# Patient Record
Sex: MALE | Race: WHITE | NOT HISPANIC OR LATINO | Employment: FULL TIME | URBAN - NONMETROPOLITAN AREA
[De-identification: names, ages, dates, MRNs, and addresses within clinical notes are randomized per-mention and may not be internally consistent; named-entity substitution may affect disease eponyms.]

---

## 2020-10-14 ENCOUNTER — APPOINTMENT (OUTPATIENT)
Dept: GENERAL RADIOLOGY | Facility: HOSPITAL | Age: 60
End: 2020-10-14

## 2020-10-14 ENCOUNTER — HOSPITAL ENCOUNTER (EMERGENCY)
Facility: HOSPITAL | Age: 60
Discharge: HOME | End: 2020-10-14
Admitting: EMERGENCY MEDICINE

## 2020-10-14 VITALS
DIASTOLIC BLOOD PRESSURE: 67 MMHG | OXYGEN SATURATION: 95 % | WEIGHT: 208 LBS | TEMPERATURE: 98 F | HEIGHT: 68 IN | RESPIRATION RATE: 19 BRPM | SYSTOLIC BLOOD PRESSURE: 131 MMHG | HEART RATE: 70 BPM | BODY MASS INDEX: 31.52 KG/M2

## 2020-10-14 DIAGNOSIS — S61.209A FINGERTIP AVULSION, INITIAL ENCOUNTER: ICD-10-CM

## 2020-10-14 DIAGNOSIS — S61.213A LACERATION OF LEFT MIDDLE FINGER WITHOUT FOREIGN BODY WITHOUT DAMAGE TO NAIL, INITIAL ENCOUNTER: ICD-10-CM

## 2020-10-14 DIAGNOSIS — Z23 NEED FOR TDAP VACCINATION: ICD-10-CM

## 2020-10-14 DIAGNOSIS — S62.639B OPEN FRACTURE OF TUFT OF DISTAL PHALANX OF FINGER: Primary | ICD-10-CM

## 2020-10-14 PROCEDURE — 90715 TDAP VACCINE 7 YRS/> IM: CPT | Performed by: PHYSICIAN ASSISTANT

## 2020-10-14 PROCEDURE — 90471 IMMUNIZATION ADMIN: CPT | Performed by: PHYSICIAN ASSISTANT

## 2020-10-14 PROCEDURE — 25010000002 PIPERACILLIN SOD-TAZOBACTAM PER 1 G: Performed by: PHYSICIAN ASSISTANT

## 2020-10-14 PROCEDURE — 25010000002 TDAP 5-2.5-18.5 LF-MCG/0.5 SUSPENSION: Performed by: PHYSICIAN ASSISTANT

## 2020-10-14 PROCEDURE — 25010000002 ONDANSETRON PER 1 MG: Performed by: PHYSICIAN ASSISTANT

## 2020-10-14 PROCEDURE — 96375 TX/PRO/DX INJ NEW DRUG ADDON: CPT

## 2020-10-14 PROCEDURE — 73130 X-RAY EXAM OF HAND: CPT

## 2020-10-14 PROCEDURE — 25010000003 LIDOCAINE 1 % SOLUTION: Performed by: PHYSICIAN ASSISTANT

## 2020-10-14 PROCEDURE — 25010000002 MORPHINE PER 10 MG: Performed by: EMERGENCY MEDICINE

## 2020-10-14 PROCEDURE — 96365 THER/PROPH/DIAG IV INF INIT: CPT

## 2020-10-14 PROCEDURE — 99284 EMERGENCY DEPT VISIT MOD MDM: CPT

## 2020-10-14 RX ORDER — ONDANSETRON 2 MG/ML
4 INJECTION INTRAMUSCULAR; INTRAVENOUS ONCE
Status: COMPLETED | OUTPATIENT
Start: 2020-10-14 | End: 2020-10-14

## 2020-10-14 RX ORDER — LIDOCAINE HYDROCHLORIDE 10 MG/ML
10 INJECTION, SOLUTION INFILTRATION; PERINEURAL ONCE
Status: COMPLETED | OUTPATIENT
Start: 2020-10-14 | End: 2020-10-14

## 2020-10-14 RX ORDER — HYDROCODONE BITARTRATE AND ACETAMINOPHEN 7.5; 325 MG/1; MG/1
1 TABLET ORAL EVERY 6 HOURS PRN
Qty: 10 TABLET | Refills: 0 | Status: SHIPPED | OUTPATIENT
Start: 2020-10-14

## 2020-10-14 RX ORDER — PAROXETINE HYDROCHLORIDE 20 MG/1
40 TABLET, FILM COATED ORAL EVERY MORNING
COMMUNITY

## 2020-10-14 RX ORDER — SODIUM CHLORIDE 0.9 % (FLUSH) 0.9 %
10 SYRINGE (ML) INJECTION AS NEEDED
Status: DISCONTINUED | OUTPATIENT
Start: 2020-10-14 | End: 2020-10-14 | Stop reason: HOSPADM

## 2020-10-14 RX ADMIN — MORPHINE SULFATE 4 MG: 4 INJECTION, SOLUTION INTRAMUSCULAR; INTRAVENOUS at 13:25

## 2020-10-14 RX ADMIN — PIPERACILLIN SODIUM AND TAZOBACTAM SODIUM 3.38 G: 3; .375 INJECTION, POWDER, LYOPHILIZED, FOR SOLUTION INTRAVENOUS at 13:31

## 2020-10-14 RX ADMIN — ONDANSETRON HYDROCHLORIDE 4 MG: 2 SOLUTION INTRAMUSCULAR; INTRAVENOUS at 13:25

## 2020-10-14 RX ADMIN — TETANUS TOXOID, REDUCED DIPHTHERIA TOXOID AND ACELLULAR PERTUSSIS VACCINE, ADSORBED 0.5 ML: 5; 2.5; 8; 8; 2.5 SUSPENSION INTRAMUSCULAR at 13:26

## 2020-10-14 RX ADMIN — SODIUM CHLORIDE 1000 ML: 9 INJECTION, SOLUTION INTRAVENOUS at 13:41

## 2020-10-14 RX ADMIN — LIDOCAINE HYDROCHLORIDE 10 ML: 10 INJECTION, SOLUTION INFILTRATION; PERINEURAL at 16:27

## 2020-10-14 NOTE — DISCHARGE INSTRUCTIONS
It is very important that you keep the dressing on your finger until you are seen at the orthopedic [facility] by Dr. Armstrong tomorrow.  There he will continue to discuss with you long-term wound care and management.  Please make sure to keep a bandage as well as antibiotic ointment over the laceration on your middle finger.  Today you were given a dose of antibiotics in the ED and Dr. Armstrong will discuss with you further need should arise for outpatient antibiotics.  Today her tetanus status was also updated is important you document this in your records.  This should not need to be updated again for the next 5 to 7 years pending additional injuries.  Please continue to rest, ice, and elevate your hand for the next 24 hours.  Please continue using anti-inflammatory medications and should you have breakthrough pain please use the pain medication.          Finger Fracture, Adult  A finger fracture is a break in any of the finger bones.  What are the causes?  The main cause of finger fractures is injury, such as from sports, a fall, or closing a drawer or door.  What increases the risk?  The following factors may make you more likely to develop this condition:  · Playing sports.  · Workplace activities that involve machinery.  · Osteoporosis. This condition makes your bones less dense and causes them to break easily.  What are the signs or symptoms?  The main symptoms of a fractured finger are pain, bruising, and swelling shortly after the injury. Other symptoms include:  · Stiffness.  · Exposed bones (compound fracture or open fracture), in severe cases.  How is this diagnosed?  This condition is diagnosed based on a physical exam, your medical history, and your symptoms. An X-ray will also be done to confirm the diagnosis.  How is this treated?  Treatment for this condition depends on the severity of the fracture. If the bones are still in place, the finger may be splinted to keep the finger still while it heals  (immobilization). If the bones are out of place, your health care provider may move them back into place by hand (manually) or surgically.  You may also need to do exercises to regain strength and flexibility (physical therapy) in your finger.  Follow these instructions at home:  If you have a splint:    · Wear the splint as told by your health care provider. Remove it only as told by your health care provider.  · Do not put pressure on any part of the splint until it is fully hardened. This may take several hours.  · Loosen the splint if your fingers tingle, become numb, or turn cold and blue.  · Keep the splint clean.  · If the splint is not waterproof:  ? Do not let it get wet.  ? Cover it with a watertight covering when you take a bath or a shower.  · Ask your health care provider when it is safe for you to drive.  Managing pain, stiffness, and swelling  · If directed, put ice on the injured area:  ? If you have a removable splint, remove it as told by your health care provider.  ? Put ice in a plastic bag.  ? Place a towel between your skin and the bag.  ? Leave the ice on for 20 minutes, 2-3 times a day.  · Move your fingers often to avoid stiffness and to lessen swelling.  · Raise (elevate) the injured area above the level of your heart while you are sitting or lying down.  Activity  · Do not drive or use heavy machinery while taking prescription pain medicine.  · Do physical therapy exercises as told by your health care provider.  · Return to your normal activities as told by your health care provider. Ask your health care provider what activities are safe for you.  General instructions  · Do not use any products that contain nicotine or tobacco, such as cigarettes and e-cigarettes. These can delay bone healing. If you need help quitting, ask your health care provider.  · Take over-the-counter and prescription medicines only as told by your health care provider.  · Keep all follow-up visits as told by your  health care provider. This is important.  Contact a health care provider if:  · Your pain or swelling gets worse, even with treatment.  · You have trouble moving your finger.  Get help right away if:  · Your finger becomes numb or blue.  Summary  · A finger fracture is a break in any of the finger bones.  · Injury is the main cause of finger fractures.  · Treatment for this condition depends on the severity of the fracture.  This information is not intended to replace advice given to you by your health care provider. Make sure you discuss any questions you have with your health care provider.  Document Released: 04/01/2002 Document Revised: 04/14/2020 Document Reviewed: 08/01/2018  [employer] Patient Education © 2020 [employer]    Nonsutured Laceration Care  A laceration is a cut that may go through all layers of the skin and extend into the tissue that is right under the skin. This type of cut is usually stitched up (sutured) or closed with tape (adhesive strips) or skin glue shortly after the injury happens.  However, if the wound is dirty or if several hours pass before medical treatment is provided, it is likely that germs (bacteria) will enter the wound. Closing a laceration after bacteria have entered it increases the risk of infection. In these cases, your health care provider may leave the laceration open (nonsutured) and cover it with a bandage. This type of treatment helps prevent infection and allows the wound to heal from the deepest layer of tissue damage up to the surface.  An open fracture is a type of injury that may involve nonsutured lacerations. An open fracture is a break in a bone that happens along with lacerations through the skin at the fracture site.  What are the risks?  Caring for a nonsutured laceration is safe. However, problems may occur, including a higher risk for:  · Scarring.  · Infection.  · Slow healing.  Supplies needed:  · Soap.  · Hand .  · Sterile water or irrigation  solution.  · Bandages (dressings).  · Clean towel.  · Antibiotic ointment.  How to care for your nonsutured laceration  Follow instructions from your health care provider about how to take care of your wound.  · Keep the wound clean and dry.  · Change any dressings as told by your health care provider. This includes changing the dressing when it starts to smell, or when it gets wet or dirty.  · Clean the wound one time each day, or as often as told by your health care provider. To clean your wound:  ? Wash your hands with soap and water. If soap and water are not available, use hand .  ? Remove any dressing as told by your health care provider.  ? Clean the wound with sterile water or irrigation solution as told by your health care provider.  ? Pat the wound dry with a clean towel. Do not rub the wound.  ? Apply a thin layer of antibiotic ointment to the wound as told by your health care provider. This will prevent infection and keep the dressing from sticking to the wound.  ? Apply a new dressing as told by your health care provider.  · Check your wound every day for signs of infection. Watch for:  ? Redness, swelling, or pain.  ? Fluid, blood, or pus.  ? Bad smell on the wound or dressing.  ? Warmth.  · Do not take baths, swim, or do anything that puts your wound underwater until your health care provider approves.  · Do not scratch or pick at the wound.  Follow these instructions at home:  · Take or apply over-the-counter and prescription medicines only as told by your health care provider.  · If you were prescribed an antibiotic medicine, take or apply it as told by your health care provider. Do not stop using the antibiotic even if your condition improves.  · Do not inject anything into the wound unless directed by your health care provider.  · Raise (elevate) the injured area above the level of your heart while you are sitting or lying down, if possible.  · If directed, put ice on the affected  area:  ? Put ice in a plastic bag.  ? Place a towel between your skin and the bag.  ? Leave the ice on for 20 minutes, 2-3 times a day.  · Keep all follow-up visits as told by your health care provider. This is important.  Contact a health care provider if:  · You received a tetanus shot and you have swelling, severe pain, redness, or bleeding at the injection site.  · You have a fever.  · Your pain is not controlled with medicine.  · You have increased redness, swelling, or pain at the site of your wound.  · You have fluid, blood, or pus coming from your wound.  · You notice a bad smell coming from your wound or your dressing.  · You notice something coming out of the wound, such as wood or glass.  · You notice a change in the color of your skin near your wound.  · You develop a new rash.  · You need to change the dressing frequently due to fluid, blood, or pus draining from the wound.  · You develop numbness around your wound.  Get help right away if:  · Your pain suddenly increases and is severe.  · You develop severe swelling around the wound.  · The wound is on your hand or foot and you cannot properly move a finger or toe.  · The wound is on your hand or foot, and you notice that your fingers or toes look pale or bluish.  · You have a red streak going away from your wound.  Summary  · A laceration is a cut that may go through all layers of the skin and extend into the tissue that is right under the skin. It is usually closed with stitches, tape, or skin glue shortly after the injury happens.  · If a wound is dirty or if several hours pass before medical treatment is provided, the laceration may be kept open (nonsutured) and covered with a bandage.  · This type of treatment helps prevent infection and allows the wound to heal from the deepest layer of tissue damage up to the surface.  · Follow instructions from your health care provider about how to take care of your wound.  This information is not intended to  replace advice given to you by your health care provider. Make sure you discuss any questions you have with your health care provider.  Document Released: 11/15/2007 Document Revised: 04/10/2020 Document Reviewed: 01/07/2019  [employer] Patient Education © 2020 [employer]

## 2020-10-14 NOTE — ED PROVIDER NOTES
Subjective   History of Present Illness    Patient is a 60-year-old male presenting to ED with left hand injury.  Patient reported he is right-hand dominant.  Patient reported that approximately 10:30 AM this morning he was at work pulling on a pulley with his right dominant hand when his left hand which was holding it in place got stuck on part of the fan belt.  Patient reported at that time his hand got pulled through the pulley system where he lacerated off the left index finger pad and sustained a small laceration to the dorsal aspect of the left middle finger.  Patient reported pain to the distal left index finger however he denies any decreased range of motion of any of the joints.  Patient denies any other joint tenderness, decreased range of motion of joints, or any other injury sustained in the event.  Patient denies any weakness or numbness of the left hand.  Patient denies any emesis but reported he has been slightly nauseous since the event.  Patient denies any medication use prior to arrival.  Patient denies any recent illnesses or known sick contact.  Patient is unaware of when his last tetanus status was updated and believes it was over 5 years ago.    Patient has known medication allergies to codeine.  Patient takes daily Paxil.  Patient has no significant medical history.  Patient has a surgical history positive for hernia repair.  Patient is a current 1 pack/day cigarette smoker.  Patient denies use of alcohol, marijuana, or any other IV/recreational/illicit drugs.  Patient denies use of any other tobacco products.    Records reviewed show patient has no previous ED visits, outpatient visits, or inpatient visits.  Patient has no previous [facility] imaging.    Review of Systems   Constitutional: Negative.    HENT: Negative.    Eyes: Negative.    Respiratory: Negative.    Cardiovascular: Negative.    Gastrointestinal: Positive for nausea. Negative for vomiting.   Genitourinary: Negative.     Musculoskeletal: Positive for arthralgias (Left distal index finger) and joint swelling (Left distal index finger).   Skin: Positive for wound (Laceration to left index finger and left middle finger).   Neurological: Negative.  Negative for weakness and numbness.   Psychiatric/Behavioral: Negative.    All other systems reviewed and are negative.      History reviewed. No pertinent past medical history.    Allergies   Allergen Reactions   • Codeine Unknown - High Severity     Pt reports he is unsure because the last time he took this medication was years ago       Past Surgical History:   Procedure Laterality Date   • HERNIA REPAIR         History reviewed. No pertinent family history.    Social History     Socioeconomic History   • Marital status: Single     Spouse name: Not on file   • Number of children: Not on file   • Years of education: Not on file   • Highest education level: Not on file   Tobacco Use   • Smoking status: Current Every Day Smoker     Packs/day: 2.00   Substance and Sexual Activity   • Alcohol use: Never     Frequency: Never           Objective   Physical Exam  Vitals signs and nursing note reviewed.   Constitutional:       General: He is in acute distress (Appears uncomfortable due to pain).      Appearance: Normal appearance. He is well-developed, well-groomed and overweight. He is not diaphoretic.   HENT:      Head: Normocephalic and atraumatic.      Mouth/Throat:      Mouth: Mucous membranes are moist.      Pharynx: Oropharynx is clear.   Eyes:      Conjunctiva/sclera: Conjunctivae normal.      Pupils: Pupils are equal, round, and reactive to light.   Neck:      Musculoskeletal: Normal range of motion.   Cardiovascular:      Rate and Rhythm: Normal rate and regular rhythm.      Pulses: Normal pulses.           Radial pulses are 2+ on the right side and 2+ on the left side.   Pulmonary:      Effort: Pulmonary effort is normal. No respiratory distress.      Breath sounds: Normal breath  sounds.   Abdominal:      General: Bowel sounds are normal.      Palpations: Abdomen is soft.   Musculoskeletal:        Hands:       Comments: Left index finger pad avulsed off with slight exposure of the distal bone.  Nail has a very small portion of the nail left on the ulnar side with most of the nail and nailbed missing.  Left index finger DIP joint has full range of motion however is swollen from the DIP joint distally.  Significant tenderness to palpitation over the distal left index finger.  Bleeding is well controlled at this time.    Approximately 1.5 cm laceration to the dorsal aspect of the left middle finger just proximal to the DIP joint.  Laceration is superficial in nature.  Left middle finger with full range of motion of all joints, no joint swelling, and minimal tenderness to palpitation over the laceration.    5/5 symmetric strength bilateral upper extremities.  Bilateral upper extremities neurovascularly intact distally with cap refill less than 2 seconds.    Left wrist as well as left proximal upper extremity with no acute findings.   Skin:     General: Skin is warm.      Capillary Refill: Capillary refill takes less than 2 seconds.      Findings: Laceration (As described in [facility] section) present.   Neurological:      Mental Status: He is alert and oriented to person, place, and time.      GCS: GCS eye subscore is 4. GCS verbal subscore is 5. GCS motor subscore is 6.      Sensory: Sensation is intact.      Motor: Motor function is intact.      Gait: Gait normal.   Psychiatric:         Attention and Perception: Attention normal.         Mood and Affect: Mood and affect normal.         Speech: Speech normal.         Behavior: Behavior normal. Behavior is cooperative.         Procedures           ED Course  ED Course as of Oct 15 0052   Wed Oct 14, 2020   1305 X-ray pending dictation.    [JS]   132 XR shows: Bony avulsion injury at the tuft of the second digit phalanx with additional overlying soft  "tissue injury.    [JS]   1340 Per Anny SABILLON after administration of pain medication patient became hypotensive at 80/38.  A liter of IV fluids hung at this time and patient is sitting up.    [JS]   1415 Discussed case with Dr. Adtii Cody who is in agreement with consult to Ortho at this time for further recommendations on wound dressing and care.    [JS]   1430 Phone discussion with Dr. Armstrong, ortho.  Recommends that patient's second limb finger be wrapped in Adaptic's very loosely with overlying Coban.  Would like patient to be seen in the clinic tomorrow for outpatient follow-up and continued wound care.  In agreement with tetanus updating here as well as single dose of IV antibiotics.  Dr. Armstrong feels patient does not need to go home on any further antibiotics.  Dr. Armstrong with no further recommendations or request at this time.    [JS]   1452 Upon reevaluation at this time patient is feeling much better with return to normotensive blood pressures and control of pain.  Discussed with patient x-ray findings as well as orthopedic discussion.  Discussed with patient need to repair left middle finger wound with sutures.  Patient declines treatment with sutures.  Discussed alternatives of using skin adhesive and Steri-Strips.  Patient continues to decline stating \"this will heal fine and I do not want to lose the bending of my finger.\"  Discussed with patient risk, benefits, and alternatives.  Patient is able to voice is back to me and continues to decline suture, adhesive glue, or Steri-Strip repair of the left middle finger.  Discussed with patient that in regards to the left index finger we will need to dress it and it is very important he follow-up at the orthopedic [place] tomorrow for continued wound reevaluation as well as long-term wound care.  Discussed with patient ability to provide a short course of pain medication.  Discussed that today his tetanus status was updated is important that he document " this in his record noting he will not require an additional booster for the next 5 to 7 years pending additional injuries.  Discussed with patient strict return precautions and need for immediate return to ED should he develop any new or worsening symptoms.  Once again offered patient repair of the left middle finger and he continues to decline.  Patient with no further questions, concerns, or needs at this time and is now stable for discharge.    [JS]   1540 [facility] report #[id number].  Patient has no prescription filled within the past year.Discussed report with Dr. Cody who is in agreement with short course of pain medication at this time.    [JS]      ED Course User Index  [JS] Leo Alvarez PA-C                                           MDM  Number of Diagnoses or Management Options  Fingertip avulsion, initial encounter:   Laceration of left middle finger without foreign body without damage to nail, initial encounter:   Need for Tdap vaccination:   Open fracture of tuft of distal phalanx of finger:      Amount and/or Complexity of Data Reviewed  Tests in the radiology section of CPT®: reviewed and ordered  Tests in the medicine section of CPT®: ordered and reviewed  Decide to obtain previous medical records or to obtain history from someone other than the patient: yes  Review and summarize past medical records: yes  Discuss the patient with other providers: yes (Dr. Preciado (attending)   Dr. Armstrong (Ortho))    Patient Progress  Patient progress: improved      Final diagnoses:   Open fracture of tuft of distal phalanx of finger   Laceration of left middle finger without foreign body without damage to nail, initial encounter   Fingertip avulsion, initial encounter   Need for Tdap vaccination            Leo Alvarez PA-C  10/15/20 0052

## 2023-12-04 ENCOUNTER — NEW PATIENT COMPREHENSIVE (OUTPATIENT)
Dept: URBAN - METROPOLITAN AREA CLINIC 100 | Facility: CLINIC | Age: 63
End: 2023-12-04

## 2023-12-04 DIAGNOSIS — B39.4: ICD-10-CM

## 2023-12-04 DIAGNOSIS — D49.89: ICD-10-CM

## 2023-12-04 PROCEDURE — 92004 COMPRE OPH EXAM NEW PT 1/>: CPT

## 2023-12-04 ASSESSMENT — TONOMETRY
OS_IOP_MMHG: 14
OD_IOP_MMHG: 14

## 2023-12-04 ASSESSMENT — VISUAL ACUITY
OS_SC: 20/100-2
OD_SC: 20/400

## 2024-06-18 ENCOUNTER — TRANSCRIBE ORDERS (OUTPATIENT)
Dept: ADMINISTRATIVE | Age: 64
End: 2024-06-18

## 2024-06-18 DIAGNOSIS — R00.2 PALPITATIONS: ICD-10-CM

## 2024-06-18 DIAGNOSIS — R07.89 OTHER CHEST PAIN: Primary | ICD-10-CM

## 2024-06-18 DIAGNOSIS — R06.00 DYSPNEA, UNSPECIFIED TYPE: ICD-10-CM

## 2024-07-29 ENCOUNTER — HOSPITAL ENCOUNTER (OUTPATIENT)
Dept: NUCLEAR MEDICINE | Age: 64
Discharge: HOME | End: 2024-07-31
Payer: MEDICARE

## 2024-07-29 DIAGNOSIS — R06.00 DYSPNEA, UNSPECIFIED TYPE: ICD-10-CM

## 2024-07-29 DIAGNOSIS — R07.89 OTHER CHEST PAIN: ICD-10-CM

## 2024-07-29 DIAGNOSIS — R00.2 PALPITATIONS: ICD-10-CM

## 2024-07-29 LAB
NUC STRESS EJECTION FRACTION: 30 %
STRESS BASELINE DIAS BP: 87 MMHG
STRESS BASELINE HR: 85 BPM
STRESS BASELINE SYS BP: 143 MMHG
STRESS EXERCISE DUR MIN: 5 MIN
STRESS EXERCISE DUR SEC: 0 SEC
STRESS PEAK DIAS BP: 88 MMHG
STRESS PEAK SYS BP: 144 MMHG
STRESS PERCENT HR ACHIEVED: 79 %
STRESS POST PEAK HR: 122 BPM
STRESS RATE PRESSURE PRODUCT: NORMAL BPM*MMHG
STRESS STAGE 1 BP: NORMAL MMHG
STRESS STAGE 1 COMMENTS: NORMAL
STRESS STAGE 1 DURATION: 1 MIN:SEC
STRESS STAGE 1 HR: 99 BPM
STRESS STAGE 2 BP: NORMAL MMHG
STRESS STAGE 2 DURATION: 1 MIN:SEC
STRESS STAGE 2 HR: 98 BPM
STRESS STAGE 3 BP: NORMAL MMHG
STRESS STAGE 3 COMMENTS: NORMAL
STRESS STAGE 3 DURATION: 1 MIN:SEC
STRESS STAGE 3 HR: 103 BPM
STRESS STAGE 4 BP: NORMAL MMHG
STRESS STAGE 4 DURATION: 1 MIN:SEC
STRESS STAGE 4 HR: 92 BPM
STRESS STAGE 5 COMMENTS: NORMAL
STRESS STAGE 5 DURATION: 1 MIN:SEC
STRESS STAGE 5 HR: 96 BPM
STRESS STAGE RECOVERY 1 BP: NORMAL MMHG
STRESS STAGE RECOVERY 1 COMMENTS: NORMAL
STRESS STAGE RECOVERY 1 DURATION: 1 MIN:SEC
STRESS STAGE RECOVERY 1 HR: 122 BPM
STRESS TARGET HR: 154 BPM

## 2024-07-29 PROCEDURE — 93016 CV STRESS TEST SUPVJ ONLY: CPT | Performed by: INTERNAL MEDICINE

## 2024-07-29 PROCEDURE — 93018 CV STRESS TEST I&R ONLY: CPT | Performed by: INTERNAL MEDICINE

## 2024-07-29 PROCEDURE — 6360000002 HC RX W HCPCS: Performed by: NURSE PRACTITIONER

## 2024-07-29 PROCEDURE — 3430000000 HC RX DIAGNOSTIC RADIOPHARMACEUTICAL: Performed by: NURSE PRACTITIONER

## 2024-07-29 PROCEDURE — 78452 HT MUSCLE IMAGE SPECT MULT: CPT

## 2024-07-29 PROCEDURE — 78452 HT MUSCLE IMAGE SPECT MULT: CPT | Performed by: INTERNAL MEDICINE

## 2024-07-29 PROCEDURE — 93017 CV STRESS TEST TRACING ONLY: CPT

## 2024-07-29 PROCEDURE — A9502 TC99M TETROFOSMIN: HCPCS | Performed by: NURSE PRACTITIONER

## 2024-07-29 RX ORDER — REGADENOSON 0.08 MG/ML
0.4 INJECTION, SOLUTION INTRAVENOUS
Status: COMPLETED | OUTPATIENT
Start: 2024-07-29 | End: 2024-07-29

## 2024-07-29 RX ADMIN — TETROFOSMIN 8 MILLICURIE: 0.23 INJECTION, POWDER, LYOPHILIZED, FOR SOLUTION INTRAVENOUS at 09:20

## 2024-07-29 RX ADMIN — REGADENOSON 0.4 MG: 0.08 INJECTION, SOLUTION INTRAVENOUS at 11:44

## 2024-07-29 RX ADMIN — TETROFOSMIN 24 MILLICURIE: 0.23 INJECTION, POWDER, LYOPHILIZED, FOR SOLUTION INTRAVENOUS at 11:50

## 2025-01-16 ENCOUNTER — TRANSCRIBE ORDERS (OUTPATIENT)
Dept: ADMINISTRATIVE | Age: 65
End: 2025-01-16

## 2025-01-16 DIAGNOSIS — N18.4 STAGE 4 CHRONIC KIDNEY DISEASE (HCC): Primary | ICD-10-CM

## 2025-01-23 ENCOUNTER — HOSPITAL ENCOUNTER (OUTPATIENT)
Dept: ULTRASOUND IMAGING | Age: 65
Discharge: HOME | End: 2025-01-23
Attending: INTERNAL MEDICINE
Payer: MEDICARE

## 2025-01-23 DIAGNOSIS — N18.4 STAGE 4 CHRONIC KIDNEY DISEASE (HCC): ICD-10-CM

## 2025-01-23 PROCEDURE — 76770 US EXAM ABDO BACK WALL COMP: CPT

## 2025-03-14 ENCOUNTER — TRANSCRIBE ORDERS (OUTPATIENT)
Dept: ADMINISTRATIVE | Facility: HOSPITAL | Age: 65
End: 2025-03-14
Payer: MEDICAID

## 2025-03-14 DIAGNOSIS — N18.4 CKD (CHRONIC KIDNEY DISEASE), STAGE IV: Primary | ICD-10-CM

## 2025-04-08 ENCOUNTER — TELEPHONE (OUTPATIENT)
Dept: INTERVENTIONAL RADIOLOGY/VASCULAR | Facility: HOSPITAL | Age: 65
End: 2025-04-08
Payer: MEDICARE

## 2025-04-08 NOTE — TELEPHONE ENCOUNTER
Called patient to confirm appointment and went over instructions for the procedure.  Patient confirmed and voiced understanding.  
Home

## 2025-04-09 ENCOUNTER — HOSPITAL ENCOUNTER (OUTPATIENT)
Dept: CT IMAGING | Facility: HOSPITAL | Age: 65
Discharge: HOME | End: 2025-04-09
Admitting: RADIOLOGY
Payer: MEDICARE

## 2025-04-09 VITALS
SYSTOLIC BLOOD PRESSURE: 129 MMHG | TEMPERATURE: 98.3 F | WEIGHT: 206.57 LBS | HEIGHT: 66 IN | HEART RATE: 67 BPM | DIASTOLIC BLOOD PRESSURE: 76 MMHG | RESPIRATION RATE: 16 BRPM | BODY MASS INDEX: 33.2 KG/M2 | OXYGEN SATURATION: 94 %

## 2025-04-09 DIAGNOSIS — N18.4 CKD (CHRONIC KIDNEY DISEASE), STAGE IV: ICD-10-CM

## 2025-04-09 LAB
GLUCOSE BLDC GLUCOMTR-MCNC: 114 MG/DL (ref 70–130)
HGB BLD-MCNC: 13 G/DL (ref 13–17.7)
INR PPP: 0.91 (ref 0.91–1.09)
PLATELET # BLD AUTO: 325 10*3/MM3 (ref 140–450)
PROTHROMBIN TIME: 12.8 SECONDS (ref 11.8–14.8)

## 2025-04-09 PROCEDURE — 85610 PROTHROMBIN TIME: CPT | Performed by: RADIOLOGY

## 2025-04-09 PROCEDURE — 77012 CT SCAN FOR NEEDLE BIOPSY: CPT

## 2025-04-09 PROCEDURE — 25010000002 FENTANYL CITRATE (PF) 50 MCG/ML SOLUTION: Performed by: RADIOLOGY

## 2025-04-09 PROCEDURE — 25010000002 MIDAZOLAM PER 1MG: Performed by: RADIOLOGY

## 2025-04-09 PROCEDURE — 25010000002 LIDOCAINE 1 % SOLUTION: Performed by: RADIOLOGY

## 2025-04-09 PROCEDURE — 82948 REAGENT STRIP/BLOOD GLUCOSE: CPT

## 2025-04-09 PROCEDURE — 85049 AUTOMATED PLATELET COUNT: CPT | Performed by: RADIOLOGY

## 2025-04-09 PROCEDURE — 85018 HEMOGLOBIN: CPT | Performed by: RADIOLOGY

## 2025-04-09 RX ORDER — FENTANYL CITRATE 50 UG/ML
INJECTION, SOLUTION INTRAMUSCULAR; INTRAVENOUS AS NEEDED
Status: COMPLETED | OUTPATIENT
Start: 2025-04-09 | End: 2025-04-09

## 2025-04-09 RX ORDER — ASPIRIN 81 MG/1
81 TABLET ORAL DAILY
COMMUNITY

## 2025-04-09 RX ORDER — SODIUM CHLORIDE 9 MG/ML
40 INJECTION, SOLUTION INTRAVENOUS AS NEEDED
Status: DISCONTINUED | OUTPATIENT
Start: 2025-04-09 | End: 2025-04-10 | Stop reason: HOSPADM

## 2025-04-09 RX ORDER — MIDAZOLAM HYDROCHLORIDE 2 MG/2ML
INJECTION, SOLUTION INTRAMUSCULAR; INTRAVENOUS AS NEEDED
Status: COMPLETED | OUTPATIENT
Start: 2025-04-09 | End: 2025-04-09

## 2025-04-09 RX ORDER — SODIUM CHLORIDE 0.9 % (FLUSH) 0.9 %
3 SYRINGE (ML) INJECTION EVERY 12 HOURS SCHEDULED
Status: DISCONTINUED | OUTPATIENT
Start: 2025-04-09 | End: 2025-04-10 | Stop reason: HOSPADM

## 2025-04-09 RX ORDER — CARVEDILOL 6.25 MG/1
6.25 TABLET ORAL 2 TIMES DAILY WITH MEALS
COMMUNITY

## 2025-04-09 RX ORDER — SODIUM CHLORIDE 0.9 % (FLUSH) 0.9 %
10 SYRINGE (ML) INJECTION AS NEEDED
Status: DISCONTINUED | OUTPATIENT
Start: 2025-04-09 | End: 2025-04-10 | Stop reason: HOSPADM

## 2025-04-09 RX ORDER — LIDOCAINE HYDROCHLORIDE 10 MG/ML
INJECTION, SOLUTION INFILTRATION; PERINEURAL AS NEEDED
Status: COMPLETED | OUTPATIENT
Start: 2025-04-09 | End: 2025-04-09

## 2025-04-09 RX ORDER — ROSUVASTATIN CALCIUM 40 MG/1
40 TABLET, COATED ORAL NIGHTLY
COMMUNITY

## 2025-04-09 RX ORDER — ERGOCALCIFEROL 1.25 MG/1
50000 CAPSULE, LIQUID FILLED ORAL WEEKLY
COMMUNITY

## 2025-04-09 RX ADMIN — FENTANYL CITRATE 25 MCG: 50 INJECTION INTRAMUSCULAR; INTRAVENOUS at 09:14

## 2025-04-09 RX ADMIN — LIDOCAINE HYDROCHLORIDE 10 ML: 10 INJECTION, SOLUTION INFILTRATION; PERINEURAL at 09:16

## 2025-04-09 RX ADMIN — MIDAZOLAM HYDROCHLORIDE 1 MG: 1 INJECTION, SOLUTION INTRAMUSCULAR; INTRAVENOUS at 09:14

## 2025-04-16 ENCOUNTER — TELEPHONE (OUTPATIENT)
Dept: INTERVENTIONAL RADIOLOGY/VASCULAR | Facility: HOSPITAL | Age: 65
End: 2025-04-16
Payer: MEDICARE

## 2025-05-08 VITALS — BODY MASS INDEX: 31.52 KG/M2 | WEIGHT: 208 LBS | HEIGHT: 68 IN

## 2025-05-08 DIAGNOSIS — N05.9 FIBRILLARY GLOMERULONEPHRITIS: Primary | ICD-10-CM

## 2025-05-08 DIAGNOSIS — N04.0 NEPHROTIC SYNDROME WITH MINOR GLOMERULAR ABNORMALITY: ICD-10-CM

## 2025-05-08 RX ORDER — MEPERIDINE HYDROCHLORIDE 25 MG/ML
12.5 INJECTION INTRAMUSCULAR; INTRAVENOUS; SUBCUTANEOUS PRN
OUTPATIENT
Start: 2025-05-08

## 2025-05-08 RX ORDER — SODIUM CHLORIDE 0.9 % (FLUSH) 0.9 %
5-40 SYRINGE (ML) INJECTION PRN
OUTPATIENT
Start: 2025-05-08

## 2025-05-08 RX ORDER — ACETAMINOPHEN 325 MG/1
650 TABLET ORAL ONCE
OUTPATIENT
Start: 2025-05-08

## 2025-05-08 RX ORDER — EPINEPHRINE 1 MG/ML
0.3 INJECTION, SOLUTION, CONCENTRATE INTRAVENOUS PRN
OUTPATIENT
Start: 2025-05-08

## 2025-05-08 RX ORDER — ONDANSETRON 2 MG/ML
8 INJECTION INTRAMUSCULAR; INTRAVENOUS
OUTPATIENT
Start: 2025-05-08

## 2025-05-08 RX ORDER — ALBUTEROL SULFATE 90 UG/1
4 INHALANT RESPIRATORY (INHALATION) PRN
OUTPATIENT
Start: 2025-05-08

## 2025-05-08 RX ORDER — DIPHENHYDRAMINE HYDROCHLORIDE 50 MG/ML
50 INJECTION, SOLUTION INTRAMUSCULAR; INTRAVENOUS ONCE
OUTPATIENT
Start: 2025-05-08 | End: 2025-05-08

## 2025-05-08 RX ORDER — HEPARIN 100 UNIT/ML
500 SYRINGE INTRAVENOUS PRN
OUTPATIENT
Start: 2025-05-08

## 2025-05-08 RX ORDER — SODIUM CHLORIDE 9 MG/ML
INJECTION, SOLUTION INTRAVENOUS CONTINUOUS
OUTPATIENT
Start: 2025-05-08

## 2025-05-08 RX ORDER — SODIUM CHLORIDE 9 MG/ML
5-250 INJECTION, SOLUTION INTRAVENOUS ONCE
OUTPATIENT
Start: 2025-05-08 | End: 2025-05-08

## 2025-05-08 RX ORDER — HYDROCORTISONE SODIUM SUCCINATE 100 MG/2ML
100 INJECTION INTRAMUSCULAR; INTRAVENOUS
OUTPATIENT
Start: 2025-05-08

## 2025-05-08 RX ORDER — ACETAMINOPHEN 325 MG/1
650 TABLET ORAL
OUTPATIENT
Start: 2025-05-08

## 2025-05-08 RX ORDER — DIPHENHYDRAMINE HYDROCHLORIDE 50 MG/ML
50 INJECTION, SOLUTION INTRAMUSCULAR; INTRAVENOUS
OUTPATIENT
Start: 2025-05-08

## 2025-05-23 ENCOUNTER — HOSPITAL ENCOUNTER (OUTPATIENT)
Dept: INFUSION THERAPY | Age: 65
Setting detail: INFUSION SERIES
Discharge: HOME | End: 2025-05-23
Payer: MEDICARE

## 2025-05-23 VITALS
HEART RATE: 95 BPM | DIASTOLIC BLOOD PRESSURE: 77 MMHG | TEMPERATURE: 97.2 F | WEIGHT: 206.9 LBS | OXYGEN SATURATION: 95 % | RESPIRATION RATE: 18 BRPM | SYSTOLIC BLOOD PRESSURE: 157 MMHG | BODY MASS INDEX: 31.46 KG/M2

## 2025-05-23 DIAGNOSIS — N04.0 NEPHROTIC SYNDROME WITH MINOR GLOMERULAR ABNORMALITY: ICD-10-CM

## 2025-05-23 DIAGNOSIS — N05.9 FIBRILLARY GLOMERULONEPHRITIS: Primary | ICD-10-CM

## 2025-05-23 PROCEDURE — 6360000002 HC RX W HCPCS: Performed by: INTERNAL MEDICINE

## 2025-05-23 PROCEDURE — 96413 CHEMO IV INFUSION 1 HR: CPT

## 2025-05-23 PROCEDURE — 96415 CHEMO IV INFUSION ADDL HR: CPT

## 2025-05-23 PROCEDURE — 6370000000 HC RX 637 (ALT 250 FOR IP): Performed by: INTERNAL MEDICINE

## 2025-05-23 PROCEDURE — 96375 TX/PRO/DX INJ NEW DRUG ADDON: CPT

## 2025-05-23 PROCEDURE — 2580000003 HC RX 258: Performed by: INTERNAL MEDICINE

## 2025-05-23 RX ORDER — SODIUM CHLORIDE 9 MG/ML
5-250 INJECTION, SOLUTION INTRAVENOUS ONCE
Status: CANCELLED | OUTPATIENT
Start: 2025-05-30 | End: 2025-05-30

## 2025-05-23 RX ORDER — EPINEPHRINE 1 MG/ML
0.3 INJECTION, SOLUTION, CONCENTRATE INTRAVENOUS PRN
Status: CANCELLED | OUTPATIENT
Start: 2025-05-30

## 2025-05-23 RX ORDER — ACETAMINOPHEN 325 MG/1
650 TABLET ORAL ONCE
Status: CANCELLED | OUTPATIENT
Start: 2025-05-30

## 2025-05-23 RX ORDER — ALBUTEROL SULFATE 90 UG/1
4 INHALANT RESPIRATORY (INHALATION) PRN
Status: CANCELLED | OUTPATIENT
Start: 2025-05-30

## 2025-05-23 RX ORDER — HEPARIN 100 UNIT/ML
500 SYRINGE INTRAVENOUS PRN
Status: CANCELLED | OUTPATIENT
Start: 2025-05-30

## 2025-05-23 RX ORDER — SODIUM CHLORIDE 9 MG/ML
INJECTION, SOLUTION INTRAVENOUS CONTINUOUS
Status: CANCELLED | OUTPATIENT
Start: 2025-05-30

## 2025-05-23 RX ORDER — DIPHENHYDRAMINE HYDROCHLORIDE 50 MG/ML
50 INJECTION, SOLUTION INTRAMUSCULAR; INTRAVENOUS ONCE
Status: CANCELLED | OUTPATIENT
Start: 2025-05-30 | End: 2025-05-30

## 2025-05-23 RX ORDER — DIPHENHYDRAMINE HYDROCHLORIDE 50 MG/ML
50 INJECTION, SOLUTION INTRAMUSCULAR; INTRAVENOUS ONCE
Status: COMPLETED | OUTPATIENT
Start: 2025-05-23 | End: 2025-05-23

## 2025-05-23 RX ORDER — DIPHENHYDRAMINE HYDROCHLORIDE 50 MG/ML
50 INJECTION, SOLUTION INTRAMUSCULAR; INTRAVENOUS
Status: CANCELLED | OUTPATIENT
Start: 2025-05-30

## 2025-05-23 RX ORDER — ONDANSETRON 2 MG/ML
8 INJECTION INTRAMUSCULAR; INTRAVENOUS
Status: CANCELLED | OUTPATIENT
Start: 2025-05-30

## 2025-05-23 RX ORDER — SODIUM CHLORIDE 0.9 % (FLUSH) 0.9 %
5-40 SYRINGE (ML) INJECTION PRN
Status: DISCONTINUED | OUTPATIENT
Start: 2025-05-23 | End: 2025-05-24 | Stop reason: HOSPADM

## 2025-05-23 RX ORDER — HYDROCORTISONE SODIUM SUCCINATE 100 MG/2ML
100 INJECTION INTRAMUSCULAR; INTRAVENOUS
Status: CANCELLED | OUTPATIENT
Start: 2025-05-30

## 2025-05-23 RX ORDER — MEPERIDINE HYDROCHLORIDE 25 MG/ML
12.5 INJECTION INTRAMUSCULAR; INTRAVENOUS; SUBCUTANEOUS PRN
Status: CANCELLED | OUTPATIENT
Start: 2025-05-30

## 2025-05-23 RX ORDER — SODIUM CHLORIDE 9 MG/ML
5-250 INJECTION, SOLUTION INTRAVENOUS ONCE
Status: COMPLETED | OUTPATIENT
Start: 2025-05-23 | End: 2025-05-23

## 2025-05-23 RX ORDER — SODIUM CHLORIDE 0.9 % (FLUSH) 0.9 %
5-40 SYRINGE (ML) INJECTION PRN
Status: CANCELLED | OUTPATIENT
Start: 2025-05-30

## 2025-05-23 RX ORDER — ACETAMINOPHEN 325 MG/1
650 TABLET ORAL
Status: CANCELLED | OUTPATIENT
Start: 2025-05-30

## 2025-05-23 RX ORDER — ACETAMINOPHEN 325 MG/1
650 TABLET ORAL ONCE
Status: COMPLETED | OUTPATIENT
Start: 2025-05-23 | End: 2025-05-23

## 2025-05-23 RX ADMIN — SODIUM CHLORIDE 800 MG: 0.9 INJECTION, SOLUTION INTRAVENOUS at 11:00

## 2025-05-23 RX ADMIN — ACETAMINOPHEN 650 MG: 325 TABLET ORAL at 10:13

## 2025-05-23 RX ADMIN — SODIUM CHLORIDE 20 ML/HR: 0.9 INJECTION, SOLUTION INTRAVENOUS at 10:59

## 2025-05-23 RX ADMIN — DIPHENHYDRAMINE HYDROCHLORIDE 50 MG: 50 INJECTION INTRAMUSCULAR; INTRAVENOUS at 10:13

## 2025-05-30 ENCOUNTER — HOSPITAL ENCOUNTER (OUTPATIENT)
Dept: INFUSION THERAPY | Age: 65
Setting detail: INFUSION SERIES
Discharge: HOME | End: 2025-05-30
Payer: MEDICARE

## 2025-05-30 VITALS
OXYGEN SATURATION: 97 % | SYSTOLIC BLOOD PRESSURE: 123 MMHG | RESPIRATION RATE: 18 BRPM | TEMPERATURE: 97 F | DIASTOLIC BLOOD PRESSURE: 82 MMHG | HEART RATE: 77 BPM

## 2025-05-30 DIAGNOSIS — N04.0 NEPHROTIC SYNDROME WITH MINOR GLOMERULAR ABNORMALITY: ICD-10-CM

## 2025-05-30 DIAGNOSIS — N05.9 FIBRILLARY GLOMERULONEPHRITIS: Primary | ICD-10-CM

## 2025-05-30 PROCEDURE — 96375 TX/PRO/DX INJ NEW DRUG ADDON: CPT

## 2025-05-30 PROCEDURE — 96415 CHEMO IV INFUSION ADDL HR: CPT

## 2025-05-30 PROCEDURE — 6360000002 HC RX W HCPCS: Performed by: INTERNAL MEDICINE

## 2025-05-30 PROCEDURE — 2500000003 HC RX 250 WO HCPCS: Performed by: INTERNAL MEDICINE

## 2025-05-30 PROCEDURE — 2580000003 HC RX 258: Performed by: INTERNAL MEDICINE

## 2025-05-30 PROCEDURE — 6370000000 HC RX 637 (ALT 250 FOR IP): Performed by: INTERNAL MEDICINE

## 2025-05-30 PROCEDURE — 96413 CHEMO IV INFUSION 1 HR: CPT

## 2025-05-30 RX ORDER — SODIUM CHLORIDE 0.9 % (FLUSH) 0.9 %
5-40 SYRINGE (ML) INJECTION PRN
OUTPATIENT
Start: 2025-06-06

## 2025-05-30 RX ORDER — SODIUM CHLORIDE 9 MG/ML
5-250 INJECTION, SOLUTION INTRAVENOUS ONCE
Status: DISCONTINUED | OUTPATIENT
Start: 2025-05-30 | End: 2025-06-01 | Stop reason: HOSPADM

## 2025-05-30 RX ORDER — SODIUM CHLORIDE 0.9 % (FLUSH) 0.9 %
5-40 SYRINGE (ML) INJECTION PRN
Status: DISCONTINUED | OUTPATIENT
Start: 2025-05-30 | End: 2025-05-31 | Stop reason: HOSPADM

## 2025-05-30 RX ORDER — ACETAMINOPHEN 325 MG/1
650 TABLET ORAL ONCE
Status: COMPLETED | OUTPATIENT
Start: 2025-05-30 | End: 2025-05-30

## 2025-05-30 RX ORDER — DIPHENHYDRAMINE HYDROCHLORIDE 50 MG/ML
50 INJECTION, SOLUTION INTRAMUSCULAR; INTRAVENOUS ONCE
OUTPATIENT
Start: 2025-06-06 | End: 2025-06-06

## 2025-05-30 RX ORDER — DIPHENHYDRAMINE HYDROCHLORIDE 50 MG/ML
50 INJECTION, SOLUTION INTRAMUSCULAR; INTRAVENOUS ONCE
Status: COMPLETED | OUTPATIENT
Start: 2025-05-30 | End: 2025-05-30

## 2025-05-30 RX ORDER — DIPHENHYDRAMINE HYDROCHLORIDE 50 MG/ML
50 INJECTION, SOLUTION INTRAMUSCULAR; INTRAVENOUS
OUTPATIENT
Start: 2025-06-06

## 2025-05-30 RX ORDER — ACETAMINOPHEN 325 MG/1
650 TABLET ORAL
OUTPATIENT
Start: 2025-06-06

## 2025-05-30 RX ORDER — EPINEPHRINE 1 MG/ML
0.3 INJECTION, SOLUTION, CONCENTRATE INTRAVENOUS PRN
OUTPATIENT
Start: 2025-06-06

## 2025-05-30 RX ORDER — HEPARIN 100 UNIT/ML
500 SYRINGE INTRAVENOUS PRN
OUTPATIENT
Start: 2025-06-06

## 2025-05-30 RX ORDER — MEPERIDINE HYDROCHLORIDE 25 MG/ML
12.5 INJECTION INTRAMUSCULAR; INTRAVENOUS; SUBCUTANEOUS PRN
OUTPATIENT
Start: 2025-06-06

## 2025-05-30 RX ORDER — ALBUTEROL SULFATE 90 UG/1
4 INHALANT RESPIRATORY (INHALATION) PRN
OUTPATIENT
Start: 2025-06-06

## 2025-05-30 RX ORDER — HYDROCORTISONE SODIUM SUCCINATE 100 MG/2ML
100 INJECTION INTRAMUSCULAR; INTRAVENOUS
OUTPATIENT
Start: 2025-06-06

## 2025-05-30 RX ORDER — SODIUM CHLORIDE 9 MG/ML
INJECTION, SOLUTION INTRAVENOUS CONTINUOUS
OUTPATIENT
Start: 2025-06-06

## 2025-05-30 RX ORDER — ONDANSETRON 2 MG/ML
8 INJECTION INTRAMUSCULAR; INTRAVENOUS
OUTPATIENT
Start: 2025-06-06

## 2025-05-30 RX ORDER — SODIUM CHLORIDE 9 MG/ML
5-250 INJECTION, SOLUTION INTRAVENOUS ONCE
OUTPATIENT
Start: 2025-06-06 | End: 2025-06-06

## 2025-05-30 RX ORDER — ACETAMINOPHEN 325 MG/1
650 TABLET ORAL ONCE
OUTPATIENT
Start: 2025-06-06

## 2025-05-30 RX ADMIN — SODIUM CHLORIDE 800 MG: 0.9 INJECTION, SOLUTION INTRAVENOUS at 11:24

## 2025-05-30 RX ADMIN — SODIUM CHLORIDE, PRESERVATIVE FREE 10 ML: 5 INJECTION INTRAVENOUS at 10:18

## 2025-05-30 RX ADMIN — DIPHENHYDRAMINE HYDROCHLORIDE 50 MG: 50 INJECTION INTRAMUSCULAR; INTRAVENOUS at 10:16

## 2025-05-30 RX ADMIN — ACETAMINOPHEN 650 MG: 325 TABLET ORAL at 10:17

## 2025-05-30 RX ADMIN — SODIUM CHLORIDE, PRESERVATIVE FREE 10 ML: 5 INJECTION INTRAVENOUS at 14:25

## 2025-05-30 NOTE — DISCHARGE INSTRUCTIONS
Rituximab Injection  (ri tux' i mab)  Brand Name(s): Riabni® (rituximab-arrx), Rituxan® (rituximab), Ruxience® (rituximab-pvvr), Truxima® (rituximab-abbs)  IMPORTANT WARNING:  You may experience a serious reaction while you receive or within 24 hours after receiving a dose of a rituximab injection product. These reactions usually happen during the first dose of a rituximab injection product and may cause death. You will receive each dose of a rituximab injection product in a medical facility, and a doctor or nurse will monitor you carefully while you are receiving the medication. You will receive certain medications to help prevent an allergic reaction before you receive each dose of a rituximab injection product. Tell your doctor if you have ever had a reaction to a rituximab product or if you have or have ever had an irregular heartbeat, chest pain, other heart problems, or lung problems. If you experience any of the following symptoms, tell your doctor or other healthcare provider immediately: hives; rash; itching; swelling of the lips, tongue, or throat; difficulty breathing or swallowing; dizziness; fainting; shortness of breath, wheezing; headache; pounding or irregular heartbeat; fast or weak pulse; pale or bluish skin; pain in the chest that may spread to other parts of the upper body; weakness; or heavy sweating. Your doctor may stop the infusion, and may treat the reaction with other medications.  Rituximab injection products have caused severe, life-threatening skin and mouth reactions. If you experience any of the following symptoms, tell your doctor immediately: painful sores or ulcers on the skin, lips, or mouth; blisters; rash; or peeling skin.  You may already be infected with hepatitis B (a virus that infects the liver and may cause severe liver damage) but not have any symptoms of the disease. In this case, receiving a rituximab injection product may increase the risk that your infection will  available online at https://www.poisonhelp.org/help. If the victim has collapsed, had a seizure, has trouble breathing, or can't be awakened, immediately call emergency services at 911.  What OTHER INFORMATION should I know?  It is important for you to keep a written list of all of the prescription and nonprescription (over-the-counter) medicines you are taking, as well as any products such as vitamins, minerals, or other dietary supplements. You should bring this list with you each time you visit a doctor or if you are admitted to a hospital. It is also important information to carry with you in case of emergencies.  This report on medications is for your information only, and is not considered individual patient advice. Because of the changing nature of drug information, please consult your physician or pharmacist about specific clinical use.  The American Society [employer] represents that the information provided hereunder was formulated with a reasonable standard of care, and in conformity with professional standards in the field. The [employer] makes no representations or warranties, express or implied, including, but not limited to, any implied warranty of merchantability and/or fitness for a particular purpose, with respect to such information and specifically disclaims all such warranties. Users are advised that decisions regarding drug therapy are complex medical decisions requiring the independent, informed decision of an appropriate health care professional, and the information is provided for informational purposes only. The entire monograph for a drug should be reviewed for a thorough understanding of the drug's actions, uses and side effects. The [employer] does not endorse or recommend the use of any drug. The information is not a substitute for medical care.  AHFS® Patient Medication

## 2025-05-30 NOTE — PROGRESS NOTES
[facility] Premeds given as ordered. Ruxience 800 mg infusion completed. Pt tolerated well.

## 2025-06-06 ENCOUNTER — HOSPITAL ENCOUNTER (OUTPATIENT)
Dept: INFUSION THERAPY | Age: 65
Setting detail: INFUSION SERIES
Discharge: HOME | End: 2025-06-06
Payer: MEDICARE

## 2025-06-06 VITALS
OXYGEN SATURATION: 95 % | BODY MASS INDEX: 31.28 KG/M2 | RESPIRATION RATE: 18 BRPM | DIASTOLIC BLOOD PRESSURE: 82 MMHG | WEIGHT: 205.7 LBS | HEART RATE: 72 BPM | TEMPERATURE: 96.9 F | SYSTOLIC BLOOD PRESSURE: 135 MMHG

## 2025-06-06 DIAGNOSIS — N05.9 FIBRILLARY GLOMERULONEPHRITIS: Primary | ICD-10-CM

## 2025-06-06 DIAGNOSIS — N04.0 NEPHROTIC SYNDROME WITH MINOR GLOMERULAR ABNORMALITY: ICD-10-CM

## 2025-06-06 PROCEDURE — 6360000002 HC RX W HCPCS: Performed by: INTERNAL MEDICINE

## 2025-06-06 PROCEDURE — 2580000003 HC RX 258: Performed by: INTERNAL MEDICINE

## 2025-06-06 PROCEDURE — 96375 TX/PRO/DX INJ NEW DRUG ADDON: CPT

## 2025-06-06 PROCEDURE — 96413 CHEMO IV INFUSION 1 HR: CPT

## 2025-06-06 PROCEDURE — 6370000000 HC RX 637 (ALT 250 FOR IP): Performed by: INTERNAL MEDICINE

## 2025-06-06 PROCEDURE — 96415 CHEMO IV INFUSION ADDL HR: CPT

## 2025-06-06 RX ORDER — SODIUM CHLORIDE 9 MG/ML
5-250 INJECTION, SOLUTION INTRAVENOUS ONCE
Status: DISCONTINUED | OUTPATIENT
Start: 2025-06-06 | End: 2025-06-08 | Stop reason: HOSPADM

## 2025-06-06 RX ORDER — SODIUM CHLORIDE 0.9 % (FLUSH) 0.9 %
5-40 SYRINGE (ML) INJECTION PRN
Status: DISCONTINUED | OUTPATIENT
Start: 2025-06-06 | End: 2025-06-07 | Stop reason: HOSPADM

## 2025-06-06 RX ORDER — ALBUTEROL SULFATE 90 UG/1
4 INHALANT RESPIRATORY (INHALATION) PRN
OUTPATIENT
Start: 2025-06-13

## 2025-06-06 RX ORDER — HEPARIN 100 UNIT/ML
500 SYRINGE INTRAVENOUS PRN
OUTPATIENT
Start: 2025-06-13

## 2025-06-06 RX ORDER — HYDROCORTISONE SODIUM SUCCINATE 100 MG/2ML
100 INJECTION INTRAMUSCULAR; INTRAVENOUS
OUTPATIENT
Start: 2025-06-13

## 2025-06-06 RX ORDER — ACETAMINOPHEN 325 MG/1
650 TABLET ORAL ONCE
Status: COMPLETED | OUTPATIENT
Start: 2025-06-06 | End: 2025-06-06

## 2025-06-06 RX ORDER — ACETAMINOPHEN 325 MG/1
650 TABLET ORAL ONCE
Status: CANCELLED | OUTPATIENT
Start: 2025-06-13

## 2025-06-06 RX ORDER — DIPHENHYDRAMINE HYDROCHLORIDE 50 MG/ML
50 INJECTION, SOLUTION INTRAMUSCULAR; INTRAVENOUS
OUTPATIENT
Start: 2025-06-13

## 2025-06-06 RX ORDER — DIPHENHYDRAMINE HYDROCHLORIDE 50 MG/ML
50 INJECTION, SOLUTION INTRAMUSCULAR; INTRAVENOUS ONCE
Status: CANCELLED | OUTPATIENT
Start: 2025-06-13 | End: 2025-06-13

## 2025-06-06 RX ORDER — ONDANSETRON 2 MG/ML
8 INJECTION INTRAMUSCULAR; INTRAVENOUS
OUTPATIENT
Start: 2025-06-13

## 2025-06-06 RX ORDER — SODIUM CHLORIDE 9 MG/ML
INJECTION, SOLUTION INTRAVENOUS CONTINUOUS
OUTPATIENT
Start: 2025-06-13

## 2025-06-06 RX ORDER — ACETAMINOPHEN 325 MG/1
650 TABLET ORAL
OUTPATIENT
Start: 2025-06-13

## 2025-06-06 RX ORDER — SODIUM CHLORIDE 9 MG/ML
5-250 INJECTION, SOLUTION INTRAVENOUS ONCE
Status: CANCELLED | OUTPATIENT
Start: 2025-06-13 | End: 2025-06-13

## 2025-06-06 RX ORDER — SODIUM CHLORIDE 0.9 % (FLUSH) 0.9 %
5-40 SYRINGE (ML) INJECTION PRN
Status: CANCELLED | OUTPATIENT
Start: 2025-06-13

## 2025-06-06 RX ORDER — EPINEPHRINE 1 MG/ML
0.3 INJECTION, SOLUTION, CONCENTRATE INTRAVENOUS PRN
OUTPATIENT
Start: 2025-06-13

## 2025-06-06 RX ORDER — DIPHENHYDRAMINE HYDROCHLORIDE 50 MG/ML
50 INJECTION, SOLUTION INTRAMUSCULAR; INTRAVENOUS ONCE
Status: COMPLETED | OUTPATIENT
Start: 2025-06-06 | End: 2025-06-06

## 2025-06-06 RX ORDER — MEPERIDINE HYDROCHLORIDE 25 MG/ML
12.5 INJECTION INTRAMUSCULAR; INTRAVENOUS; SUBCUTANEOUS PRN
OUTPATIENT
Start: 2025-06-13

## 2025-06-06 RX ADMIN — SODIUM CHLORIDE 800 MG: 0.9 INJECTION, SOLUTION INTRAVENOUS at 11:31

## 2025-06-06 RX ADMIN — DIPHENHYDRAMINE HYDROCHLORIDE 50 MG: 50 INJECTION INTRAMUSCULAR; INTRAVENOUS at 10:24

## 2025-06-06 RX ADMIN — ACETAMINOPHEN 650 MG: 325 TABLET ORAL at 10:24

## 2025-06-06 NOTE — DISCHARGE INSTRUCTIONS
Rituximab Injection (RITUXIMAB - INJECTION)  This medicine is used for the following purposes:  arthritis  autoimmune disorder  inflammation of blood vessels  skin disease  cancer  Brand Name(s): Rituxan, Ruxience, Truxima  Generic Name: Rituximab-pvvr, Rituximab-abbs, Rituximab  Instructions  This medicine is given gradually through the IV line.  Protect medicine from light.  Speak with your nurse or pharmacist about how long the medicine can be stored safely at room temperature or in the refrigerator before it needs to be discarded.  Please ask your doctor, nurse, or pharmacist how to discard unused medicines safely.  This medicine should be given by a trained health care provider.  It is important that you keep taking each dose of this medicine on time even if you are feeling well.  If you miss a dose, contact your doctor for instructions.  Drug interactions can change how medicines work or increase risk for side effects. Tell your health care providers about all medicines taken. Include prescription and over-the-counter medicines, vitamins, and herbal medicines. Speak with your doctor or pharmacist before starting or stopping any medicine.  Your doctor may prescribe other medications to reduce side effects. Follow instructions carefully.  Keep all appointments for medical exams and tests while on this medicine.  Cautions  Tell your doctor and pharmacist if you ever had an allergic reaction to a medicine.  Some patients on this medicine have developed severe, life-threatening infections. Please speak with your doctor about the risks and benefits of using this medicine.  This medicine is associated with a rare, but serious problem of the liver. Speak to your doctor about the early signs of liver problems and the benefits and risks of using this medicine.  Your ability to stay alert or to react quickly may be impaired by this medicine. Do not drive or operate machinery until you know how this medicine will  heartbeat  pain in the joints  signs of kidney damage (such as change in urine color or bubbly urine)  signs of liver damage (such as yellowing of eye or skin, dark urine, or unusual tiredness)  mouth sores or irritation  muscle cramps or weakness  red, peeling or blistering skin  sore throat  slurred speech  bloody or dark, tarry stools  excessive thirst  persistent or unusual thirst  increased urinary frequency  difficulty or discomfort urinating  vaginal itching or yeast infection  blurring or changes of vision  severe or persistent vomiting  weakness on one side of the body  A few people may have an allergic reaction to this medicine. Symptoms can include difficulty breathing, skin rash, itching, swelling, or severe dizziness. If you notice any of these symptoms, seek medical help quickly.  Please speak with your doctor, nurse, or pharmacist if you have any questions about this medicine.  IMPORTANT NOTE: This document tells you briefly how to take your medicine, but it does not tell you all there is to know about it. Your doctor or pharmacist may give you other documents about your medicine. Please talk to them if you have any questions. Always follow their advice.  There is a more complete description of this medicine available in English. Scan this code on your smartphone or tablet or use the web address below. You can also ask your pharmacist for a printout. If you have any questions, please ask your pharmacist.  The display and use of this drug information is subject to Terms of Use.  More information about RITUXIMAB - INJECTION      Copyright(c) 2023 [employer]  Selected from data included with permission and copyright by [employer]. This copyrighted material has been downloaded from a licensed data provider and is not for distribution, except as may be authorized by the applicable terms of use.  Conditions of Use: The information in this database is intended to supplement, not substitute

## 2025-06-06 NOTE — PROGRESS NOTES
Pt arrived to [facility]. PIV inserted and brisk blood return noted. Pt received Tylenol and Benadryl for pre-meds, followed by 800mg Ruxience. Tolerated well.    Electronically signed by Lanie Banegas RN on 6/6/2025 at 2:37 PM

## 2025-06-13 ENCOUNTER — HOSPITAL ENCOUNTER (OUTPATIENT)
Dept: INFUSION THERAPY | Age: 65
Setting detail: INFUSION SERIES
Discharge: HOME | End: 2025-06-13
Payer: MEDICARE

## 2025-06-13 VITALS
HEART RATE: 77 BPM | OXYGEN SATURATION: 95 % | RESPIRATION RATE: 18 BRPM | TEMPERATURE: 97 F | DIASTOLIC BLOOD PRESSURE: 77 MMHG | SYSTOLIC BLOOD PRESSURE: 128 MMHG

## 2025-06-13 DIAGNOSIS — N04.0 NEPHROTIC SYNDROME WITH MINOR GLOMERULAR ABNORMALITY: ICD-10-CM

## 2025-06-13 DIAGNOSIS — N05.9 FIBRILLARY GLOMERULONEPHRITIS: Primary | ICD-10-CM

## 2025-06-13 PROCEDURE — 2580000003 HC RX 258: Performed by: INTERNAL MEDICINE

## 2025-06-13 PROCEDURE — 96413 CHEMO IV INFUSION 1 HR: CPT

## 2025-06-13 PROCEDURE — 96415 CHEMO IV INFUSION ADDL HR: CPT

## 2025-06-13 PROCEDURE — 6370000000 HC RX 637 (ALT 250 FOR IP): Performed by: INTERNAL MEDICINE

## 2025-06-13 PROCEDURE — 6360000002 HC RX W HCPCS: Performed by: INTERNAL MEDICINE

## 2025-06-13 PROCEDURE — 96375 TX/PRO/DX INJ NEW DRUG ADDON: CPT

## 2025-06-13 RX ORDER — ACETAMINOPHEN 325 MG/1
650 TABLET ORAL
OUTPATIENT
Start: 2025-06-13

## 2025-06-13 RX ORDER — ACETAMINOPHEN 325 MG/1
650 TABLET ORAL ONCE
Status: CANCELLED | OUTPATIENT
Start: 2025-06-13

## 2025-06-13 RX ORDER — SODIUM CHLORIDE 0.9 % (FLUSH) 0.9 %
5-40 SYRINGE (ML) INJECTION PRN
OUTPATIENT
Start: 2025-06-13

## 2025-06-13 RX ORDER — SODIUM CHLORIDE 9 MG/ML
INJECTION, SOLUTION INTRAVENOUS CONTINUOUS
OUTPATIENT
Start: 2025-06-13

## 2025-06-13 RX ORDER — SODIUM CHLORIDE 9 MG/ML
5-250 INJECTION, SOLUTION INTRAVENOUS ONCE
Status: CANCELLED | OUTPATIENT
Start: 2025-06-13 | End: 2025-06-13

## 2025-06-13 RX ORDER — DIPHENHYDRAMINE HYDROCHLORIDE 50 MG/ML
50 INJECTION, SOLUTION INTRAMUSCULAR; INTRAVENOUS ONCE
Status: CANCELLED | OUTPATIENT
Start: 2025-06-13 | End: 2025-06-13

## 2025-06-13 RX ORDER — HEPARIN 100 UNIT/ML
500 SYRINGE INTRAVENOUS PRN
OUTPATIENT
Start: 2025-06-13

## 2025-06-13 RX ORDER — ONDANSETRON 2 MG/ML
8 INJECTION INTRAMUSCULAR; INTRAVENOUS
OUTPATIENT
Start: 2025-06-13

## 2025-06-13 RX ORDER — HYDROCORTISONE SODIUM SUCCINATE 100 MG/2ML
100 INJECTION INTRAMUSCULAR; INTRAVENOUS
OUTPATIENT
Start: 2025-06-13

## 2025-06-13 RX ORDER — EPINEPHRINE 1 MG/ML
0.3 INJECTION, SOLUTION, CONCENTRATE INTRAVENOUS PRN
OUTPATIENT
Start: 2025-06-13

## 2025-06-13 RX ORDER — ACETAMINOPHEN 325 MG/1
650 TABLET ORAL ONCE
Status: COMPLETED | OUTPATIENT
Start: 2025-06-13 | End: 2025-06-13

## 2025-06-13 RX ORDER — SODIUM CHLORIDE 0.9 % (FLUSH) 0.9 %
5-40 SYRINGE (ML) INJECTION PRN
Status: DISCONTINUED | OUTPATIENT
Start: 2025-06-13 | End: 2025-06-14 | Stop reason: HOSPADM

## 2025-06-13 RX ORDER — ALBUTEROL SULFATE 90 UG/1
4 INHALANT RESPIRATORY (INHALATION) PRN
OUTPATIENT
Start: 2025-06-13

## 2025-06-13 RX ORDER — SODIUM CHLORIDE 9 MG/ML
5-250 INJECTION, SOLUTION INTRAVENOUS ONCE
Status: DISCONTINUED | OUTPATIENT
Start: 2025-06-13 | End: 2025-06-15 | Stop reason: HOSPADM

## 2025-06-13 RX ORDER — MEPERIDINE HYDROCHLORIDE 25 MG/ML
12.5 INJECTION INTRAMUSCULAR; INTRAVENOUS; SUBCUTANEOUS PRN
OUTPATIENT
Start: 2025-06-13

## 2025-06-13 RX ORDER — DIPHENHYDRAMINE HYDROCHLORIDE 50 MG/ML
50 INJECTION, SOLUTION INTRAMUSCULAR; INTRAVENOUS ONCE
Status: COMPLETED | OUTPATIENT
Start: 2025-06-13 | End: 2025-06-13

## 2025-06-13 RX ORDER — DIPHENHYDRAMINE HYDROCHLORIDE 50 MG/ML
50 INJECTION, SOLUTION INTRAMUSCULAR; INTRAVENOUS
OUTPATIENT
Start: 2025-06-13

## 2025-06-13 RX ADMIN — SODIUM CHLORIDE 800 MG: 0.9 INJECTION, SOLUTION INTRAVENOUS at 10:35

## 2025-06-13 RX ADMIN — DIPHENHYDRAMINE HYDROCHLORIDE 50 MG: 50 INJECTION INTRAMUSCULAR; INTRAVENOUS at 10:01

## 2025-06-13 RX ADMIN — ACETAMINOPHEN 650 MG: 325 TABLET ORAL at 10:02

## 2025-06-13 NOTE — PROGRESS NOTES
PIV inserted and tylenol and benadryl given as ordered for premeds. Ruxience 800 mg given. Pt tolerated well.